# Patient Record
Sex: MALE | Race: ASIAN | URBAN - METROPOLITAN AREA
[De-identification: names, ages, dates, MRNs, and addresses within clinical notes are randomized per-mention and may not be internally consistent; named-entity substitution may affect disease eponyms.]

---

## 2019-11-18 ENCOUNTER — TRANSFERRED RECORDS (OUTPATIENT)
Dept: HEALTH INFORMATION MANAGEMENT | Facility: CLINIC | Age: 69
End: 2019-11-18

## 2019-11-25 ENCOUNTER — MEDICAL CORRESPONDENCE (OUTPATIENT)
Dept: HEALTH INFORMATION MANAGEMENT | Facility: CLINIC | Age: 69
End: 2019-11-25

## 2019-11-27 ENCOUNTER — TRANSCRIBE ORDERS (OUTPATIENT)
Dept: OTHER | Age: 69
End: 2019-11-27

## 2019-11-27 DIAGNOSIS — D33.3 ACOUSTIC NEUROMA (H): Primary | ICD-10-CM

## 2019-12-06 NOTE — TELEPHONE ENCOUNTER
FUTURE VISIT INFORMATION      FUTURE VISIT INFORMATION:    Date: 1/2/20    Time: 9:15 AM; Audio 8:30 AM    Location: [facility] [place]-[facility]  REFERRAL INFORMATION:    Referring provider:  Dr. Mihir Dillon    Referring providers clinic:  [facility] - ENT    Reason for visit/diagnosis  Acoustic Neuroma    RECORDS REQUESTED FROM:       Clinic name Comments Records Status Imaging Status   [facility] 11/26/19 - Telephone Encounter Referral with Dr. Dillon  11/19/19 - ENT OV with Dr. Dillon  11/18/19 - Audiogram with Deidre Talley   11/15/19 - [facility] OV with Dr. Paredes Care Everywhere    12/11 Sent to Scan    [facility] - Imaging 11/23/19 - MRI Head Brain IACS W/WO  4/30/18 - CT Head Brain WO Care Everywhere 12/6 Req - In PACS   [facility] 2/23/16 - OV with Dr. Nadja Pacheco Epic                        * 12/6/19 11:54 AM Faxed request to [facility] for images to be pushed and audiograms to be set - Nancy  * 12/11/19 2:38 PM imaged received in PACs and attached to patient, audiogram faxed and sent to HIM to be scanned into chart - Nancy

## 2019-12-20 DIAGNOSIS — H91.90 HEARING LOSS, UNSPECIFIED HEARING LOSS TYPE, UNSPECIFIED LATERALITY: Primary | ICD-10-CM

## 2020-01-02 ENCOUNTER — OFFICE VISIT (OUTPATIENT)
Dept: AUDIOLOGY | Facility: CLINIC | Age: 70
End: 2020-01-02
Attending: OTOLARYNGOLOGY
Payer: COMMERCIAL

## 2020-01-02 ENCOUNTER — PRE VISIT (OUTPATIENT)
Dept: OTOLARYNGOLOGY | Facility: CLINIC | Age: 70
End: 2020-01-02

## 2020-01-02 ENCOUNTER — OFFICE VISIT (OUTPATIENT)
Dept: OTOLARYNGOLOGY | Facility: CLINIC | Age: 70
End: 2020-01-02
Payer: COMMERCIAL

## 2020-01-02 DIAGNOSIS — H91.90 HEARING LOSS, UNSPECIFIED HEARING LOSS TYPE, UNSPECIFIED LATERALITY: ICD-10-CM

## 2020-01-02 DIAGNOSIS — H90.3 ASYMMETRIC SNHL (SENSORINEURAL HEARING LOSS): Primary | ICD-10-CM

## 2020-01-02 DIAGNOSIS — D33.3 VESTIBULAR SCHWANNOMA (H): Primary | ICD-10-CM

## 2020-01-02 ASSESSMENT — PAIN SCALES - GENERAL: PAINLEVEL: NO PAIN (0)

## 2020-01-02 NOTE — PROGRESS NOTES
AUDIOLOGY REPORT    SUMMARY: Audiology visit completed. See audiogram for results.      RECOMMENDATIONS: Follow-up with ENT.      Sari Harkins.  Licensed Audiologist  [place] #[id number]

## 2020-01-02 NOTE — PROGRESS NOTES
Interview facilitated by phone Mauritanian .    History of Present Illness: Rafael Newton is seen in consultation from Dr. Mihir Dillon.  He is a 69 year old male being seen for right vestibular schwannoma. The patient was evaluated by Dr. Dillon on 11/19/2019 for right sided hearing loss. Subsequent MRI IAC's revealed findings consistent with a right vestibular schwannoma. The patient reports that he has experienced progressive right sided hearing loss over the past 3 months. He denies otalgia, otorrhea, facial twitching/numbness, HA. No vertigo or worsening balance.    Past Medical History:   Diagnosis Date     Prostate cancer (H) 7/22/2016       PSHx: None    Family History   Problem Relation Age of Onset     Breast Cancer Sister      Diabetes No family hx of      Coronary Artery Disease No family hx of        Social History     Tobacco Use     Smoking status: Never Smoker     Smokeless tobacco: Never Used   Substance Use Topics     Alcohol use: No     Drug use: No       Patient Supplied Answers to Review of Systems   The remainder of the 10 point review of systems is otherwise negative.    Physical examination:  Constitutional:  In no acute distress, appears stated age  Eyes:  Extraocular movements intact, no spontaneous nystagmus  Ears:  Both ears examined. Right ear canal patent, dry, TM intact with normal landmarks, middle ear well-aerated. Left ear canal patent, dry, TM intact with normal landmarks, middle ear well-aerated.  Respiratory:  No increased work of breathing, wheezing or stridor  Musculoskeletal:  Good upper extremity strength  Skin:  No rashes on the head and neck  Neurologic:  House Brackman 1/6 bilaterally, ambulating normally  Psychiatric:  Alert, normal affect, answering questions appropriately    Audiogram: 1/2/2020   Right ear: Mild sloping to moderate SNHL.   Left ear: Normal sloping to moderate SNHL.   WR right: 68%, left: 84%   Acoustic Reflexes: Right ipsi, left ipsi and contra  present. Right contra absent.  Tympanograms: type A right, type A left     Imaging  MR HEAD BRAIN IACS W/WO 11/22/2019  CONCLUSION:  1. There is a 16 x 20 x 12 mm (AP x TRV x SI) T2 intermediate, heterogeneously enhancing mass centered in the right cerebellopontine angle cistern with extension through the porus acusticus into the right internal auditory canal. Enhancing mass in the right cerebellopontine angle cistern extending through the porus acusticus is into the right internal auditory canal is most consistent with a vestibular schwannoma. The mass exerts moderate mass effect in the adjacent right middle cerebellar peduncle without vasogenic edema.  2. No evidence of acute infarction or intracranial hemorrhage.  3. Mild chronic small vessel ischemic change and mild diffuse brain parenchymal volume loss.    Imaging independently reviewed by Dr. Zena Huynh. Contrast enhancing lesion of right cerebellopontine angle consistent with vestibular schwannoma.    Assessment and plan:  Rafael Newton is a 69 year old male who presents with a right moderate sized vestibular schwannoma.    We reviewed the treatment options and risks and benefits of each were discussed including observation, radiosurgery and surgical resection.      We reviewed the goal of radiosurgery being to stop tumor growth rather than to remove the lesion. We reviewed what this outpatient procedure entails. Success rates are in the 80% range or higher for a tumor of this size. The risks and side effects of radiosurgery are typically delayed in onset but include facial paralysis, dizziness, hearing loss, and risk to surrounding structures. There is also a small risk of malignant degeneration.     Rafael Newton is a candidate for the translabyrinthine approach. We reviewed the goal of tumor removal and low recurrence rate. At the same time, there are risks to facial function and expected loss of residual hearing and vestibular function.  There are also risks of  spinal fluid leak, infection, other neurologic dysfunction, and rare risks of stroke, coma, or death. We also discussed length of hospital stay averages 3 to 5 days, and recovery time up to 6 to 12 weeks with activity restrictions. He  understands the risk to the facial nerve increases slightly with growth of the tumor.     Rafael Newton asked insightful questions and indicated that he would like to pursue radiosurgery at this time. We will provide him with a referral to Dr. Joaquina Qiu to discuss this option in more detail. The patient expressed understanding and is in agreement with this plan.  All questions were answered.    Austin Hernandez MD  Neurotology Fellow  Department of Otolaryngology - Head and Neck Surgery  [facility]    I, Zena Huynh MD, saw this patient with the resident/fellow and agree with the resident/fellow s findings and plan of care as documented in the resident s/fellow s note.    Zena Huynh MD

## 2020-01-02 NOTE — PATIENT INSTRUCTIONS
1. You were seen in the ENT Clinic today by .  If you have any questions or concerns after your appointment, please call   - Option 1: ENT Clinic: [phone]  - Option 2: Karen ('s Nurse): [phone]    2.   Plan to return to clinic to discuss Gamma knife with Dr. Lazarus Jones, RN  [facility] Otolaryngology  [phone]

## 2020-01-02 NOTE — LETTER
1/2/2020       RE: Rafael Newton  [address] [address]     Dear Colleague,    Thank you for referring your patient, Rafael Newton, to the [facility] EAR NOSE AND THROAT at [facility]. Please see a copy of my visit note below.    Interview facilitated by phone Moldovan .    History of Present Illness: Rafael Newton is seen in consultation from Dr. Mihir Dillon.  He is a 69 year old male being seen for right vestibular schwannoma. The patient was evaluated by Dr. Dillon on 11/19/2019 for right sided hearing loss. Subsequent MRI IAC's revealed findings consistent with a right vestibular schwannoma. The patient reports that he has experienced progressive right sided hearing loss over the past 3 months. He denies otalgia, otorrhea, facial twitching/numbness, HA. No vertigo or worsening balance.    Past Medical History:   Diagnosis Date     Prostate cancer (H) 7/22/2016       PSHx: None    Family History   Problem Relation Age of Onset     Breast Cancer Sister      Diabetes No family hx of      Coronary Artery Disease No family hx of        Social History     Tobacco Use     Smoking status: Never Smoker     Smokeless tobacco: Never Used   Substance Use Topics     Alcohol use: No     Drug use: No       Patient Supplied Answers to Review of Systems   The remainder of the 10 point review of systems is otherwise negative.    Physical examination:  Constitutional:  In no acute distress, appears stated age  Eyes:  Extraocular movements intact, no spontaneous nystagmus  Ears:  Both ears examined. Right ear canal patent, dry, TM intact with normal landmarks, middle ear well-aerated. Left ear canal patent, dry, TM intact with normal landmarks, middle ear well-aerated.  Respiratory:  No increased work of breathing, wheezing or stridor  Musculoskeletal:  Good upper extremity strength  Skin:  No rashes on the head and neck  Neurologic:  House Brackman 1/6 bilaterally, ambulating  normally  Psychiatric:  Alert, normal affect, answering questions appropriately    Audiogram: 1/2/2020   Right ear: Mild sloping to moderate SNHL.   Left ear: Normal sloping to moderate SNHL.   WR right: 68%, left: 84%   Acoustic Reflexes: Right ipsi, left ipsi and contra present. Right contra absent.  Tympanograms: type A right, type A left     Imaging  MR HEAD BRAIN IACS W/WO 11/22/2019  CONCLUSION:  1. There is a 16 x 20 x 12 mm (AP x TRV x SI) T2 intermediate, heterogeneously enhancing mass centered in the right cerebellopontine angle cistern with extension through the porus acusticus into the right internal auditory canal. Enhancing mass in the right cerebellopontine angle cistern extending through the porus acusticus is into the right internal auditory canal is most consistent with a vestibular schwannoma. The mass exerts moderate mass effect in the adjacent right middle cerebellar peduncle without vasogenic edema.  2. No evidence of acute infarction or intracranial hemorrhage.  3. Mild chronic small vessel ischemic change and mild diffuse brain parenchymal volume loss.    Imaging independently reviewed by Dr. Zena Huynh. Contrast enhancing lesion of right cerebellopontine angle consistent with vestibular schwannoma.    Assessment and plan:  Rafael Newton is a 69 year old male who presents with a right moderate sized vestibular schwannoma.    We reviewed the treatment options and risks and benefits of each were discussed including observation, radiosurgery and surgical resection.      We reviewed the goal of radiosurgery being to stop tumor growth rather than to remove the lesion. We reviewed what this outpatient procedure entails. Success rates are in the 80% range or higher for a tumor of this size. The risks and side effects of radiosurgery are typically delayed in onset but include facial paralysis, dizziness, hearing loss, and risk to surrounding structures. There is also a small risk of malignant  degeneration.     Rafael Newton is a candidate for the translabyrinthine approach. We reviewed the goal of tumor removal and low recurrence rate. At the same time, there are risks to facial function and expected loss of residual hearing and vestibular function.  There are also risks of spinal fluid leak, infection, other neurologic dysfunction, and rare risks of stroke, coma, or death. We also discussed length of hospital stay averages 3 to 5 days, and recovery time up to 6 to 12 weeks with activity restrictions.  He  understands the risk to the facial nerve increases slightly with growth of the tumor.     Rafael Newton asked insightful questions and indicated that he would like to pursue radiosurgery at this time. We will provide him with a referral to Dr. Joaquina Qiu to discuss this option in more detail. The patient expressed understanding and is in agreement with this plan.  All questions were answered.    Austin Hernandez MD  Neurotology Fellow  Department of Otolaryngology - Head and Neck Surgery  [facility]    I, Zena Huynh MD, saw this patient with the resident/fellow and agree with the resident/fellow s findings and plan of care as documented in the resident s/fellow s note.    Zena Huynh MD

## 2020-01-07 ENCOUNTER — TELEPHONE (OUTPATIENT)
Dept: OTOLARYNGOLOGY | Facility: CLINIC | Age: 70
End: 2020-01-07

## 2020-01-07 NOTE — TELEPHONE ENCOUNTER
[facility] Call Center    Phone Message    May a detailed message be left on voicemail: yes    Reason for Call: Other: Pt requesting call back to reschedule appointment     Action Taken: Message routed to:  [facility] ENT